# Patient Record
Sex: FEMALE | URBAN - METROPOLITAN AREA
[De-identification: names, ages, dates, MRNs, and addresses within clinical notes are randomized per-mention and may not be internally consistent; named-entity substitution may affect disease eponyms.]

---

## 2020-11-10 ENCOUNTER — APPOINTMENT (OUTPATIENT)
Age: 35
Setting detail: DERMATOLOGY
End: 2020-11-13

## 2020-11-10 DIAGNOSIS — L57.8 OTHER SKIN CHANGES DUE TO CHRONIC EXPOSURE TO NONIONIZING RADIATION: ICD-10-CM

## 2020-11-10 PROCEDURE — OTHER COUNSELING: OTHER

## 2020-11-10 PROCEDURE — 99202 OFFICE O/P NEW SF 15 MIN: CPT

## 2020-11-10 PROCEDURE — OTHER IN-HOUSE DISPENSING PHARMACY: OTHER

## 2020-11-10 PROCEDURE — OTHER MIPS QUALITY: OTHER

## 2020-11-10 ASSESSMENT — LOCATION ZONE DERM
LOCATION ZONE: FACE
LOCATION ZONE: ARM

## 2020-11-10 ASSESSMENT — LOCATION DETAILED DESCRIPTION DERM
LOCATION DETAILED: LEFT MEDIAL MALAR CHEEK
LOCATION DETAILED: LEFT DISTAL DORSAL FOREARM
LOCATION DETAILED: LEFT INFERIOR CENTRAL MALAR CHEEK
LOCATION DETAILED: RIGHT INFERIOR CENTRAL MALAR CHEEK
LOCATION DETAILED: RIGHT PROXIMAL DORSAL FOREARM

## 2020-11-10 ASSESSMENT — LOCATION SIMPLE DESCRIPTION DERM
LOCATION SIMPLE: LEFT CHEEK
LOCATION SIMPLE: RIGHT FOREARM
LOCATION SIMPLE: RIGHT CHEEK
LOCATION SIMPLE: LEFT FOREARM

## 2020-11-10 NOTE — PROCEDURE: IN-HOUSE DISPENSING PHARMACY
Product 4 Application Directions: apply to affected area once daily\\n\\nLot# [id number] Product 4 Application Directions: apply to affected area once daily\\n\\nLot# [id number]

## 2020-11-10 NOTE — PROCEDURE: IN-HOUSE DISPENSING PHARMACY
Product 11 Application Directions: Apply a pea size amount to face QHS \\nLot# [id number] Product 11 Application Directions: Apply a pea size amount to face QHS \\nLot# [id number]

## 2020-11-10 NOTE — PROCEDURE: IN-HOUSE DISPENSING PHARMACY
Product 10 Application Directions: Apply pea size amount to face QHS\\nLot# [id number] Product 10 Application Directions: Apply pea size amount to face QHS\\nLot# [id number]

## 2020-11-10 NOTE — PROCEDURE: IN-HOUSE DISPENSING PHARMACY
Product 2 Application Directions: Apply to pigmented areas QHS\\n\\nLot# [id number] Product 2 Application Directions: Apply to pigmented areas QHS\\n\\nLot# [id number]@

## 2020-11-10 NOTE — PROCEDURE: IN-HOUSE DISPENSING PHARMACY
Product 12 Application Directions: Apply pea size amount to face QHS \\nLot# [id number] Product 12 Application Directions: Apply pea size amount to face QHS \\nLot# [id number]@9

## 2020-11-10 NOTE — PROCEDURE: IN-HOUSE DISPENSING PHARMACY
Product 5 Application Directions: Apply a pea-size amount to face daily\\n\\nLot# [id number] Product 5 Application Directions: Apply a pea-size amount to face daily\\n\\nLot# [id number]

## 2020-11-10 NOTE — PROCEDURE: IN-HOUSE DISPENSING PHARMACY
Product 9 Application Directions: Apply pea size amount to face QHS\\n\\nLot# [id number] Product 9 Application Directions: Apply pea size amount to face QHS\\n\\nLot# [id number]

## 2020-11-10 NOTE — PROCEDURE: IN-HOUSE DISPENSING PHARMACY
Product 7 Application Directions: Apply to affected area twice daily\\n\\[id number]@9 Product 7 Application Directions: Apply to affected area twice daily\\n\\[id number]@9

## 2020-11-10 NOTE — PROCEDURE: IN-HOUSE DISPENSING PHARMACY
Product 6 Application Directions: Apply pea size amount to face daily\\n\\nLot# [id number]@1 Product 6 Application Directions: Apply pea size amount to face daily\\n\\nLot# [id number]@1

## 2020-11-10 NOTE — PROCEDURE: IN-HOUSE DISPENSING PHARMACY
Product 13 Application Directions: AAA BID\\nLot # [id number] Product 13 Application Directions: AAA BID\\nLot # [id number]@

## 2020-11-10 NOTE — PROCEDURE: IN-HOUSE DISPENSING PHARMACY
Product 1 Application Directions: apply to affected area twice daily \\n\\nLot# [id number] Product 1 Application Directions: apply to affected area twice daily \\n\\nLot# [id number]

## 2020-11-10 NOTE — PROCEDURE: IN-HOUSE DISPENSING PHARMACY
Product 8 Application Directions: APPLY PEA SIZE AMOUNT TO FACE QHS\\n\\nLot# [id number] Product 8 Application Directions: APPLY PEA SIZE AMOUNT TO FACE QHS\\n\\nLot# [id number]

## 2020-11-10 NOTE — PROCEDURE: MIPS QUALITY
Quality 110: Preventive Care And Screening: Influenza Immunization: Influenza Immunization not Administered because Patient Refused.
Detail Level: Zone
Quality 226: Preventive Care And Screening: Tobacco Use: Screening And Cessation Intervention: Patient screened for tobacco use and is an ex/non-smoker

## 2020-11-10 NOTE — PROCEDURE: IN-HOUSE DISPENSING PHARMACY
Product 3 Application Directions: Apply to pigmented areas QHS\\n\\nLot: [id number] Product 3 Application Directions: Apply to pigmented areas QHS\\n\\nLot: [id number]@